# Patient Record
Sex: MALE | Race: WHITE | URBAN - METROPOLITAN AREA
[De-identification: names, ages, dates, MRNs, and addresses within clinical notes are randomized per-mention and may not be internally consistent; named-entity substitution may affect disease eponyms.]

---

## 2024-11-14 ENCOUNTER — OFFICE VISIT (OUTPATIENT)
Facility: CLINIC | Age: 19
End: 2024-11-14

## 2024-11-14 VITALS
SYSTOLIC BLOOD PRESSURE: 138 MMHG | WEIGHT: 195.2 LBS | RESPIRATION RATE: 16 BRPM | TEMPERATURE: 98.1 F | DIASTOLIC BLOOD PRESSURE: 72 MMHG | HEIGHT: 68 IN | BODY MASS INDEX: 29.58 KG/M2 | OXYGEN SATURATION: 98 % | HEART RATE: 64 BPM

## 2024-11-14 DIAGNOSIS — B36.0 TINEA VERSICOLOR: ICD-10-CM

## 2024-11-14 DIAGNOSIS — Z00.00 ENCOUNTER FOR MEDICAL EXAMINATION TO ESTABLISH CARE: Primary | ICD-10-CM

## 2024-11-14 DIAGNOSIS — Z11.59 NEED FOR HEPATITIS C SCREENING TEST: ICD-10-CM

## 2024-11-14 DIAGNOSIS — Z23 NEEDS FLU SHOT: ICD-10-CM

## 2024-11-14 DIAGNOSIS — E66.3 OVERWEIGHT WITH BODY MASS INDEX (BMI) 25.0-29.9: ICD-10-CM

## 2024-11-14 DIAGNOSIS — Z13.220 SCREENING FOR LIPID DISORDERS: ICD-10-CM

## 2024-11-14 DIAGNOSIS — Z11.4 SCREENING FOR HIV WITHOUT PRESENCE OF RISK FACTORS: ICD-10-CM

## 2024-11-14 DIAGNOSIS — Z83.3 FAMILY HISTORY OF DIABETES MELLITUS TYPE II: ICD-10-CM

## 2024-11-14 DIAGNOSIS — R03.0 ELEVATED BLOOD PRESSURE READING IN OFFICE WITHOUT DIAGNOSIS OF HYPERTENSION: ICD-10-CM

## 2024-11-14 SDOH — ECONOMIC STABILITY: INCOME INSECURITY: HOW HARD IS IT FOR YOU TO PAY FOR THE VERY BASICS LIKE FOOD, HOUSING, MEDICAL CARE, AND HEATING?: NOT HARD AT ALL

## 2024-11-14 SDOH — ECONOMIC STABILITY: FOOD INSECURITY: WITHIN THE PAST 12 MONTHS, YOU WORRIED THAT YOUR FOOD WOULD RUN OUT BEFORE YOU GOT MONEY TO BUY MORE.: NEVER TRUE

## 2024-11-14 SDOH — ECONOMIC STABILITY: FOOD INSECURITY: WITHIN THE PAST 12 MONTHS, THE FOOD YOU BOUGHT JUST DIDN'T LAST AND YOU DIDN'T HAVE MONEY TO GET MORE.: NEVER TRUE

## 2024-11-14 ASSESSMENT — PATIENT HEALTH QUESTIONNAIRE - PHQ9
SUM OF ALL RESPONSES TO PHQ QUESTIONS 1-9: 0
SUM OF ALL RESPONSES TO PHQ9 QUESTIONS 1 & 2: 0
SUM OF ALL RESPONSES TO PHQ QUESTIONS 1-9: 0
SUM OF ALL RESPONSES TO PHQ QUESTIONS 1-9: 0
2. FEELING DOWN, DEPRESSED OR HOPELESS: NOT AT ALL
SUM OF ALL RESPONSES TO PHQ QUESTIONS 1-9: 0
1. LITTLE INTEREST OR PLEASURE IN DOING THINGS: NOT AT ALL

## 2024-11-14 NOTE — PROGRESS NOTES
Chief Complaint   Patient presents with    New Patient     Adonay Cm is an 19 y.o. male who presents as a new patient to [facility] to establish care.     Annual Exam     He presents for his annual physical examination.      \"Have you been to the ER, urgent care clinic since your last visit?  Hospitalized since your last visit?\"    NO    “Have you seen or consulted any other health care providers outside of [facility] since your last visit?”    NO            Click Here for Release of Records Request  
General: Normal range of motion.      Cervical back: Normal range of motion and neck supple.      Right lower leg: No edema.      Left lower leg: No edema.   Skin:     General: Skin is warm and dry. Scattered hypopigmented rash to back throughout     Capillary Refill: Capillary refill takes less than 2 seconds.   Neurological:      Mental Status: Alert and oriented to person, place, and time.      Gait: Gait normal.   Psychiatric:         Mood and Affect: Mood normal.         Behavior: Behavior normal.         Thought Content: Thought content normal.     No results found for this or any previous visit.    The ASCVD Risk score (Carlos SANCHEZ, et al., 2019) failed to calculate for the following reasons:    The 2019 ASCVD risk score is only valid for ages 40 to 79    Immunization History   Administered Date(s) Administered    Influenza, FLUCELVAX, (age 6 mo+) IM, Trivalent PF, 0.5mL 11/14/2024          Assessment & Plan  Encounter for medical examination to establish care    Healthy lifestyle discussed with patient, including routine vaccination, diet, vitamin supplement, exercise, non-smoking, safe sexual practices and reduction of stress. Assessment and plan reviewed with patient.         Tinea versicolor    Will send new treatment. Discussed this may take some time. Encouraged pt to reach out if no improvement after one month for add'l treatment options.     Orders:    terbinafine (LAMISIL) 1 % cream; Apply topically 2 times daily.    Elevated blood pressure reading in office without diagnosis of hypertension    Discussed causes, encouraged diet changes    Orders:    CBC with Auto Differential; Future    Comprehensive Metabolic Panel; Future    TSH; Future    Screening for lipid disorders    No record    Orders:    Lipid Panel; Future    Family history of diabetes mellitus type II    Discussed increased risk of diabetes given both parents having diabetes.  Strongly encouraged diet changes to prevent chronic medical

## 2024-11-14 NOTE — PATIENT INSTRUCTIONS
Preventive Medicine Counseling with A and B Level Recommendations from the [place] Preventive Service Taskforce (USPSTF):   Breast self-exam: Routinely perform self exams after periods. Discussed the importance of breast exams in screening for breast cancer.  Diet: Encouraged high fiber, low fat and low sodium diet. Diet rich in fruits, vegetables and whole grains.  Domestic violence: Discussed partner safety  Exercise: Encouraged cardiovascular and weight bearing exercise most days.  Safety: seat belt - Discussed the important role of seat belts in protecting from injuries during an automobile accident  Diabetes Screening: The USPSTF recommends screening for prediabetes and type 2 diabetes in adults aged 35 to 70 years who have overweight or obesity   Calcium supplementation: Encouraged 2-3 daily servings of low fat calcium rich foods such as cheese, milk and yogurt.  Sunscreen: Discussed the importance of using sunscreen to protect from the sun's harmful ultraviolet rays. Anyone can get skin cancer regardless of age, gender, or race! In fact, it's estimated that one in five Americans will develop skin cancer in their lifetime.  Health: Encouraged eye annually and dental q6 months. Encouraged annual flu vaccines and Tdap q10 years. Shingrix at age 60 and pneumonia vaccines at 65. Alcohol use, unhealthy drug use, and depression screening is also routinely recommended. Monitor skin for changes. Reviewed ABCDE's of skin evaluation.   Chlamydia and Gonorrhea: The USPSTF recommends screening for gonorrhea in all sexually active women 24 years or younger and in women 25 years or older who are at increased risk for infection.   Colon Cancer Screening: beginning at age 45 years old (or earlier based on risk factors) and thereafter based on previous result and risk factors.   Lung Cancer Screening: The USPSTF recommends annual screening for lung cancer with low-dose computed tomography (LDCT) in adults aged 50 to 80 years who